# Patient Record
Sex: FEMALE | Race: BLACK OR AFRICAN AMERICAN | Employment: UNEMPLOYED | URBAN - METROPOLITAN AREA
[De-identification: names, ages, dates, MRNs, and addresses within clinical notes are randomized per-mention and may not be internally consistent; named-entity substitution may affect disease eponyms.]

---

## 2017-03-11 PROBLEM — N39.0 UTI (URINARY TRACT INFECTION): Status: ACTIVE | Noted: 2017-03-11

## 2017-03-11 PROBLEM — R77.8 ELEVATED TROPONIN: Status: ACTIVE | Noted: 2017-03-11

## 2017-03-11 PROBLEM — K74.60 CIRRHOSIS (HCC): Status: ACTIVE | Noted: 2017-03-11

## 2017-05-30 ENCOUNTER — OFFICE VISIT (OUTPATIENT)
Dept: SURGERY | Age: 72
End: 2017-05-30

## 2017-05-30 VITALS
DIASTOLIC BLOOD PRESSURE: 54 MMHG | SYSTOLIC BLOOD PRESSURE: 98 MMHG | HEIGHT: 64 IN | BODY MASS INDEX: 23.39 KG/M2 | HEART RATE: 62 BPM | WEIGHT: 137 LBS

## 2017-05-30 DIAGNOSIS — K76.82 HEPATIC ENCEPHALOPATHY: ICD-10-CM

## 2017-05-30 DIAGNOSIS — B20 HISTORY OF HIV INFECTION (HCC): ICD-10-CM

## 2017-05-30 DIAGNOSIS — N18.6 TYPE 2 DIABETES MELLITUS WITH CHRONIC KIDNEY DISEASE ON CHRONIC DIALYSIS, WITHOUT LONG-TERM CURRENT USE OF INSULIN (HCC): ICD-10-CM

## 2017-05-30 DIAGNOSIS — Z86.73 HISTORY OF STROKE: ICD-10-CM

## 2017-05-30 DIAGNOSIS — E11.22 TYPE 2 DIABETES MELLITUS WITH CHRONIC KIDNEY DISEASE ON CHRONIC DIALYSIS, WITHOUT LONG-TERM CURRENT USE OF INSULIN (HCC): ICD-10-CM

## 2017-05-30 DIAGNOSIS — Z87.19: ICD-10-CM

## 2017-05-30 DIAGNOSIS — N18.6 END-STAGE RENAL DISEASE ON HEMODIALYSIS (HCC): ICD-10-CM

## 2017-05-30 DIAGNOSIS — Z99.2 TYPE 2 DIABETES MELLITUS WITH CHRONIC KIDNEY DISEASE ON CHRONIC DIALYSIS, WITHOUT LONG-TERM CURRENT USE OF INSULIN (HCC): ICD-10-CM

## 2017-05-30 DIAGNOSIS — Z99.2 END-STAGE RENAL DISEASE ON HEMODIALYSIS (HCC): ICD-10-CM

## 2017-05-30 DIAGNOSIS — Z87.19 HISTORY OF ESOPHAGEAL VARICES WITH BLEEDING: ICD-10-CM

## 2017-05-30 DIAGNOSIS — T82.510A: Primary | ICD-10-CM

## 2017-05-30 PROCEDURE — 99214 OFFICE O/P EST MOD 30 MIN: CPT | Performed by: SURGERY

## 2017-06-02 ENCOUNTER — TELEPHONE (OUTPATIENT)
Dept: SURGERY | Age: 72
End: 2017-06-02

## 2017-11-02 ENCOUNTER — HOSPITAL ENCOUNTER (OUTPATIENT)
Dept: OTHER | Age: 72
Discharge: HOME | End: 2017-11-30
Attending: INTERNAL MEDICINE | Admitting: INTERNAL MEDICINE

## 2017-11-25 PROBLEM — K76.82 ACUTE HEPATIC ENCEPHALOPATHY: Status: ACTIVE | Noted: 2017-11-25

## 2017-11-28 PROBLEM — B20 AIDS (ACQUIRED IMMUNODEFICIENCY SYNDROME), CD4 <=200 (HCC): Status: ACTIVE | Noted: 2017-11-28

## 2017-11-28 PROBLEM — B20 AIDS (ACQUIRED IMMUNODEFICIENCY SYNDROME), CD4 <=200 (HCC): Chronic | Status: ACTIVE | Noted: 2017-11-28

## 2017-12-01 ENCOUNTER — HOSPITAL ENCOUNTER (OUTPATIENT)
Dept: OTHER | Age: 72
Discharge: HOME | End: 2017-12-31
Attending: INTERNAL MEDICINE | Admitting: INTERNAL MEDICINE

## 2017-12-02 PROBLEM — K76.82 ACUTE HEPATIC ENCEPHALOPATHY: Status: RESOLVED | Noted: 2017-11-25 | Resolved: 2017-12-02

## 2017-12-02 PROBLEM — R77.8 ELEVATED TROPONIN: Status: RESOLVED | Noted: 2017-03-11 | Resolved: 2017-12-02

## 2017-12-02 PROBLEM — N39.0 URINARY TRACT INFECTION WITHOUT HEMATURIA: Status: RESOLVED | Noted: 2017-03-11 | Resolved: 2017-12-02

## 2018-09-25 ENCOUNTER — APPOINTMENT (OUTPATIENT)
Dept: CT IMAGING | Age: 73
End: 2018-09-25
Payer: MEDICARE

## 2018-09-25 ENCOUNTER — HOSPITAL ENCOUNTER (EMERGENCY)
Age: 73
Discharge: HOME | End: 2018-09-25
Attending: EMERGENCY MEDICINE
Payer: MEDICARE

## 2018-09-25 ENCOUNTER — APPOINTMENT (OUTPATIENT)
Dept: GENERAL RADIOLOGY | Age: 73
End: 2018-09-25
Payer: MEDICARE

## 2018-09-25 VITALS
HEIGHT: 65 IN | BODY MASS INDEX: 18.11 KG/M2 | SYSTOLIC BLOOD PRESSURE: 121 MMHG | HEART RATE: 101 BPM | OXYGEN SATURATION: 100 % | DIASTOLIC BLOOD PRESSURE: 85 MMHG | TEMPERATURE: 98.1 F | WEIGHT: 108.69 LBS | RESPIRATION RATE: 16 BRPM
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
## 2018-09-25 DIAGNOSIS — S01.111A LACERATION OF RIGHT EYEBROW, INITIAL ENCOUNTER: ICD-10-CM

## 2018-09-25 DIAGNOSIS — S01.01XA LACERATION OF SCALP, INITIAL ENCOUNTER: ICD-10-CM

## 2018-09-25 DIAGNOSIS — S09.90XA CLOSED HEAD INJURY, INITIAL ENCOUNTER: ICD-10-CM
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
## 2018-09-25 PROCEDURE — 99284 EMERGENCY DEPT VISIT MOD MDM: CPT

## 2018-09-25 PROCEDURE — 6360000002 HC RX W HCPCS: Performed by: NURSE PRACTITIONER

## 2018-09-25 PROCEDURE — 73522 X-RAY EXAM HIPS BI 3-4 VIEWS: CPT

## 2018-09-25 PROCEDURE — 90471 IMMUNIZATION ADMIN: CPT | Performed by: NURSE PRACTITIONER

## 2018-09-25 PROCEDURE — 2500000003 HC RX 250 WO HCPCS: Performed by: NURSE PRACTITIONER

## 2018-09-25 PROCEDURE — 4500000024 HC ED LEVEL 4 PROCEDURE

## 2018-09-25 PROCEDURE — 90715 TDAP VACCINE 7 YRS/> IM: CPT | Performed by: NURSE PRACTITIONER

## 2018-09-25 PROCEDURE — 72125 CT NECK SPINE W/O DYE: CPT

## 2018-09-25 PROCEDURE — 6370000000 HC RX 637 (ALT 250 FOR IP): Performed by: NURSE PRACTITIONER

## 2018-09-25 PROCEDURE — 70450 CT HEAD/BRAIN W/O DYE: CPT

## 2018-09-25 RX ORDER — LIDOCAINE HYDROCHLORIDE AND EPINEPHRINE 10; 10 MG/ML; UG/ML
20 INJECTION, SOLUTION INFILTRATION; PERINEURAL ONCE
Status: COMPLETED | OUTPATIENT
Start: 2018-09-25 | End: 2018-09-25

## 2018-09-25 RX ORDER — HYDROCODONE BITARTRATE AND ACETAMINOPHEN 5; 325 MG/1; MG/1
1 TABLET ORAL ONCE
Status: COMPLETED | OUTPATIENT
Start: 2018-09-25 | End: 2018-09-25

## 2018-09-25 RX ADMIN — TETANUS TOXOID, REDUCED DIPHTHERIA TOXOID AND ACELLULAR PERTUSSIS VACCINE, ADSORBED 0.5 ML: 5; 2.5; 8; 8; 2.5 SUSPENSION INTRAMUSCULAR at 09:50

## 2018-09-25 RX ADMIN — LIDOCAINE HYDROCHLORIDE AND EPINEPHRINE 30 ML: 10; 10 INJECTION, SOLUTION INFILTRATION; PERINEURAL at 10:23

## 2018-09-25 RX ADMIN — HYDROCODONE BITARTRATE AND ACETAMINOPHEN 1 TABLET: 5; 325 TABLET ORAL at 10:22

## 2018-09-25 ASSESSMENT — PAIN SCALES - GENERAL
PAINLEVEL_OUTOF10: 8

## 2018-09-25 ASSESSMENT — PAIN DESCRIPTION - DESCRIPTORS: DESCRIPTORS: CONSTANT

## 2018-09-25 ASSESSMENT — PAIN DESCRIPTION - PAIN TYPE: TYPE: ACUTE PAIN

## 2018-09-25 ASSESSMENT — PAIN DESCRIPTION - LOCATION: LOCATION: HEAD

## 2018-09-25 ASSESSMENT — PAIN DESCRIPTION - ORIENTATION: ORIENTATION: RIGHT

## 2018-09-25 ASSESSMENT — PAIN DESCRIPTION - FREQUENCY: FREQUENCY: CONTINUOUS

## 2018-09-25 NOTE — ED PROVIDER NOTES
[address]uk[address]  [address] [facility] [address]  Dept: [phone]  Loc: [phone]    eMERGENCY dEPARTMENT eNCOUnter        [address] [facility]    Chief Complaint   Patient presents with    Facial Laceration     pt fell today and has a lac on her head       HPI    Jo Bull is a 72 y.o. female who presents to the emergency department after falling at home today. Patient hit the right eyebrow and side of her head on the carpet. This was a witnessed fall by a family member who lives with the patient. The patient has a history of frequent falls. This was not a syncopal episode. She has a gait disturbance ever since having a hip fracture in July of this year and is had an unsteady gait since the repair. She does have a history of dementia and she forgets to use the walker according to the family member. There is not a loss of consciousness. The patient complains of eyebrow pain and neck pain. She complains of bilateral hip pain. She denies any other injuries. She denies visual disturbances. She denies numbness or paresthesias to the extremities. She denies any oral injuries. REVIEW OF SYSTEMS    Neurologic: see HPI, No extremity weakness, no LOC  Musculoskeletal: see HPI  Cardiac: No chest pain or chest injury  Respiratory: No difficulty breathing  GI: No abdominal pain or abdominal injury  : No dysuria or hematuria  General: No fever  All other systems reviewed and are negative.      PAST MEDICAL & SURGICAL HISTORY    Past Medical History:   Diagnosis Date    Acidosis     Cirrhosis of liver ([address]     biopsy proven; TIPS procedure in [facility]    Diabetes mellitus [facility]     Difficulty in walking     Disorder of phosphorus metabolism     Displaced intertrochanteric fracture of right femur, subsequent encounter for closed fracture with malunion     Edema     End stage renal disease on dialysis ([facility]     End-stage renal disease [facility] hemodialysis [facility]     Gout     Hemodialysis patient ([facility]     History of esophageal varices with bleeding     History of stroke     [facility]     Hx of hepatic disease     Hypertension associated with diabetes [facility]     Limitation of activities due to disability     Vitamin D deficiency      Past Surgical History:   Procedure Laterality Date    ANGIOPLASTY Left 6/7/13    Left upper arm brachial/cephalic fistula    BRAIN ANEURYSM SURGERY  1970    DIALYSIS FISTULA CREATION Left 3/22/13    Left arm brachial cephalic AV fistula and removal of graft    TIPS PROCEDURE      [place] for bleeding varices       CURRENT MEDICATIONS  (may include discharge medications prescribed in the ED)  Current Outpatient Rx   Medication Sig Dispense Refill    lactulose (CHRONULAC) 10 GM/15ML solution Take 60 mLs by mouth 4 times daily      melatonin 3 MG TABS tablet Take 3 mg by mouth nightly as needed      oxyCODONE (ROXICODONE) 5 MG immediate release tablet Take 5 mg by mouth every 12 hours as needed for Pain. [place] traMADol (ULTRAM) 50 MG tablet Take 50 mg by mouth every 6 hours as needed for Pain. [place] dapsone 100 MG tablet Take 100 mg by mouth daily      promethazine (PHENERGAN) 12.5 MG tablet Take 12.5 mg by mouth every 6 hours as needed      sevelamer (RENVELA) 800 MG tablet Take 800 mg by mouth 3 times daily      Darunavir Ethanolate 100 MG/ML SUSP Take 800 mg by mouth every evening       dolutegravir sodium (TIVICAY) 50 MG tablet Take 50 mg by mouth every evening       emtricitabine (EMTRIVA) 10 MG/ML solution Take 60 mg by mouth three times a week Monday Wednesday Friday      ritonavir (NORVIR) 100 MG capsule Take 100 mg by mouth every evening       B Complex-C-Folic Acid (RENAL) 1 MG CAPS Take 1 capsule by mouth daily       omeprazole (PRILOSEC) 20 MG delayed release capsule Take 40 mg by mouth Daily       dicyclomine (BENTYL) 10 MG capsule Take 10 mg by mouth 4 times daily (before meals and nightly)       acetaminophen (TYLENOL) 325 MG tablet Take 650 mg by mouth every 4 hours as needed for Pain      rifaximin (XIFAXAN) 550 MG tablet Take 550 mg by mouth 2 times daily         ALLERGIES    Allergies   Allergen Reactions    Sulfa Antibiotics      rash       SOCIAL & FAMILY HISTORY    Social History     Social History    Marital status:      Spouse name: N/A    Number of children: 3    Years of education: N/A     Social History Main Topics    Smoking status: Never Smoker    Smokeless tobacco: Never Used    Alcohol use No    Drug use: No    Sexual activity: No     Other Topics Concern    Not on file     Social History Narrative    No narrative on file     Family History   Problem Relation Age of Onset    Family history unknown: Yes       PHYSICAL EXAM    VITAL SIGNS: /85   Pulse 101   Temp 98.1 °F (36.7 °C) (Oral)   Resp 16   Ht 5' 5\" (1.651 m)   Wt 108 lb 11 oz (49.3 kg)   SpO2 100%   BMI 18.09 kg/m²    Constitutional:  Well developed, very thin and fragile   Scalp: enmanuel-sized right parietal scalp hematoma with small superficial abrasion. No palpable skull fractures  Neck: no posterior midline neck tenderness, no JVD   Eyes: Pupils equally round and react to light, extraocular movement are intact. HENT:  No trismus, airway patent, no hemotympanum noted bilaterally  Respiratory:  Lungs clear to auscultation bilaterally, no retractions   Cardiovascular:  Heart rate on my physical exam a 78 bpm, regular rhythm. S1-S2. No murmurs. Radial, pedal and posttibial pulses 2+ equal bilaterally. Brisk capillary refill to fingers and toes. Extremities are warm and natural color. No peripheral edema noted. GI:  Soft, nontender, normal bowel sounds  Musculoskeletal:  Full range of motion against resistance with flexion and extension at the wrists, elbows and shoulders bilaterally.   Patient has 4/5 bilateral lower extremity because patient has a head injury and she is complaining of neck pain and a skin her head and neck. I also x-rayed her hips. X-rays of the hips show healing right intertrochanteric fracture with ORIF hardware in place. There is minimal lucency adjacent to the distal locking screw which is new from the previous examination and could represent loosening. No acute fracture is visualized. CT of the cervical spine without contrast shows multilevel degenerative changes. No acute osseous abnormality. CT the head shows no acute intracranial abnormality. I discussed the hip x-ray results with my attending physician Dr. Nathaly Martin. This felt that the patient is stable to go home with orthopedics for follow-up. The caregiver was instructed to follow-up with orthopedics in the next couple of days. She is to return to the emergency department for worsening symptoms. Close head injury instructions were reviewed with the patient when to return to the ED. Eyebrow laceration was repaired. Please see procedure note. Patient tolerated well. The patient's caregiver verbalized understanding of the discharge instructions. FINAL IMPRESSION    1. Fall at home, initial encounter    2. Closed head injury, initial encounter    3. Laceration of scalp, initial encounter    4.  Laceration of right eyebrow, initial encounter        PLAN   Discharge with outpatient follow-up (see EMR)    (Please note that this note was completed with a voice recognition program.  Every attempt was made to edit the dictations, but inevitably there remain words that are mis-transcribed.)       SARY Henriquez - ROSEMARY  09/25/18 2001

## 2018-09-25 NOTE — ED PROVIDER NOTES
I independently performed a history and physical on Angie. All diagnostic, treatment, and disposition decisions were made by myself in conjunction with the mid-level provider. Patient presents today after getting up last evening without using a walker apparently falling. Hitting her forehead and sustaining a laceration. X-ray of the hip shows a mild lucency adjacent to the distal locking screw other than that the fracture plane is less distinct. CT of the head without contrast was read as negative as well as CT cervical spine. Her laceration was repaired discharged in stable condition. Feel orthopedic follow-up as also indicated. For further details of [facility] emergency department encounter, please see ANDREW documentation.         Sandy Horton MD  09/25/18 [id number] Christina Graf MD  09/29/18 [id number]

## 2018-09-25 NOTE — ED NOTES
Pt assisted to wheel chair and care given to pt caregiver/ granddaughter. Pt was Alert and oriented to person and place. Pt was still in pain but per pt granddaughter this is her normal states and this behavior is not new. Pt was able to talk in full sentences and without difficulty.       Lois Morris RN  09/25/18 [id number]